# Patient Record
Sex: FEMALE | Race: WHITE | NOT HISPANIC OR LATINO | Employment: FULL TIME | URBAN - METROPOLITAN AREA
[De-identification: names, ages, dates, MRNs, and addresses within clinical notes are randomized per-mention and may not be internally consistent; named-entity substitution may affect disease eponyms.]

---

## 2018-08-24 PROBLEM — O99.820 GBS (GROUP B STREPTOCOCCUS CARRIER), +RV CULTURE, CURRENTLY PREGNANT: Status: ACTIVE | Noted: 2018-08-24

## 2018-09-18 NOTE — H&P (VIEW-ONLY)
`History and Physical  Obstetrics      SUBJECTIVE:     Karis Mckenzie is a 28 y.o.  female at 39w5d  admitted for induction of labor.  Her current obstetrical history is significant for n/a.        (Not in a hospital admission)    Review of patient's allergies indicates:  No Known Allergies     Past Medical History:   Diagnosis Date    Hypothyroidism     Ovarian cyst      Past Surgical History:   Procedure Laterality Date    CYSTECTOMY-OVARIAN Left 2014    Performed by [facility]oll [facility] at [facility]    OOPHERECTOMY Right 2014    Performed by [facility] at [facility]    OOPHORECTOMY      dermoid     Family History   Problem Relation Age of Onset    Breast cancer Neg Hx     Colon cancer Neg Hx     Ovarian cancer Neg Hx      Social History     Tobacco Use    Smoking status: Never Smoker    Smokeless tobacco: Never Used   Substance Use Topics    Alcohol use: Yes     Comment: occassionally    Drug use: No        OBJECTIVE:     Vital Signs (Most Recent)  BP: (!) 137/91 (18 0851)    Physical Exam:  General:  Normal, alert and oriented                       Abdomen: Soft gravid no FT   Uterine Size:  c/w dates   Presentations:  vertex   FHT: reviewed   Pelvis: NEFG   Cervix:     Dilation: 2 cm    Effacement: 75%    Station:  -3               Laboratory:  @LABRCNTIP(ABORH,HEPBSAG,RUBELLAIGGSC,GBS,AFP,SLHAMWW3OF)@   ASSESSMENT/PLAN:     39w5d gestation.  Not in labor.   Conditions: N/A

## 2018-09-19 ENCOUNTER — HOSPITAL ENCOUNTER (INPATIENT)
Facility: HOSPITAL | Age: 28
LOS: 3 days | Discharge: HOME | End: 2018-09-22
Attending: OBSTETRICS & GYNECOLOGY | Admitting: OBSTETRICS & GYNECOLOGY
Payer: COMMERCIAL

## 2018-09-19 ENCOUNTER — ANESTHESIA (OUTPATIENT)
Dept: OBSTETRICS AND GYNECOLOGY | Facility: HOSPITAL | Age: 28
End: 2018-09-19
Payer: COMMERCIAL

## 2018-09-19 ENCOUNTER — ANESTHESIA EVENT (OUTPATIENT)
Dept: OBSTETRICS AND GYNECOLOGY | Facility: HOSPITAL | Age: 28
End: 2018-09-19
Payer: COMMERCIAL

## 2018-09-19 DIAGNOSIS — Z34.83 PRENATAL CARE, SUBSEQUENT PREGNANCY, THIRD TRIMESTER: ICD-10-CM

## 2018-09-19 LAB
ABO + RH BLD: NORMAL
BASOPHILS # BLD AUTO: 0.04 K/UL
BASOPHILS NFR BLD: 0.3 %
BLD GP AB SCN CELLS X3 SERPL QL: NORMAL
DIFFERENTIAL METHOD: ABNORMAL
EOSINOPHIL # BLD AUTO: 0.1 K/UL
EOSINOPHIL NFR BLD: 0.7 %
ERYTHROCYTE [DISTWIDTH] IN BLOOD BY AUTOMATED COUNT: 13.1 %
HCT VFR BLD AUTO: 35.7 %
HGB BLD-MCNC: 11.9 G/DL
LYMPHOCYTES # BLD AUTO: 2 K/UL
LYMPHOCYTES NFR BLD: 16.3 %
MCH RBC QN AUTO: 31.2 PG
MCHC RBC AUTO-ENTMCNC: 33.3 G/DL
MCV RBC AUTO: 94 FL
MONOCYTES # BLD AUTO: 0.9 K/UL
MONOCYTES NFR BLD: 7.6 %
NEUTROPHILS # BLD AUTO: 9.2 K/UL
NEUTROPHILS NFR BLD: 74.8 %
PLATELET # BLD AUTO: 143 K/UL
PMV BLD AUTO: 13 FL
RBC # BLD AUTO: 3.81 M/UL
WBC # BLD AUTO: 12.36 K/UL

## 2018-09-19 PROCEDURE — 27200710 HC EPIDURAL INFUSION PUMP SET: Performed by: ANESTHESIOLOGY

## 2018-09-19 PROCEDURE — S0020 INJECTION, BUPIVICAINE HYDRO: HCPCS | Performed by: ANESTHESIOLOGY

## 2018-09-19 PROCEDURE — 72200005 HC VAGINAL DELIVERY LEVEL II

## 2018-09-19 PROCEDURE — 86850 RBC ANTIBODY SCREEN: CPT

## 2018-09-19 PROCEDURE — 63600175 PHARM REV CODE 636 W HCPCS: Performed by: OBSTETRICS & GYNECOLOGY

## 2018-09-19 PROCEDURE — 63600175 PHARM REV CODE 636 W HCPCS

## 2018-09-19 PROCEDURE — 72100003 HC LABOR CARE, EA. ADDL. 8 HRS

## 2018-09-19 PROCEDURE — 27800517 HC TRAY,EPIDURAL-CONTINUOUS: Performed by: ANESTHESIOLOGY

## 2018-09-19 PROCEDURE — 72100002 HC LABOR CARE, 1ST 8 HOURS

## 2018-09-19 PROCEDURE — 85025 COMPLETE CBC W/AUTO DIFF WBC: CPT

## 2018-09-19 PROCEDURE — 25000003 PHARM REV CODE 250: Performed by: ANESTHESIOLOGY

## 2018-09-19 PROCEDURE — 25000003 PHARM REV CODE 250: Performed by: OBSTETRICS & GYNECOLOGY

## 2018-09-19 PROCEDURE — 51702 INSERT TEMP BLADDER CATH: CPT

## 2018-09-19 PROCEDURE — 86592 SYPHILIS TEST NON-TREP QUAL: CPT

## 2018-09-19 PROCEDURE — 11000001 HC ACUTE MED/SURG PRIVATE ROOM

## 2018-09-19 PROCEDURE — 0HQ9XZZ REPAIR PERINEUM SKIN, EXTERNAL APPROACH: ICD-10-PCS | Performed by: OBSTETRICS & GYNECOLOGY

## 2018-09-19 PROCEDURE — 59409 OBSTETRICAL CARE: CPT | Mod: AA,,, | Performed by: ANESTHESIOLOGY

## 2018-09-19 PROCEDURE — 62326 NJX INTERLAMINAR LMBR/SAC: CPT | Performed by: ANESTHESIOLOGY

## 2018-09-19 RX ORDER — ONDANSETRON 8 MG/1
8 TABLET, ORALLY DISINTEGRATING ORAL EVERY 8 HOURS PRN
Status: DISCONTINUED | OUTPATIENT
Start: 2018-09-19 | End: 2018-09-19

## 2018-09-19 RX ORDER — LEVOTHYROXINE SODIUM 25 UG/1
75 TABLET ORAL
Status: DISCONTINUED | OUTPATIENT
Start: 2018-09-20 | End: 2018-09-22 | Stop reason: HOSPADM

## 2018-09-19 RX ORDER — SODIUM CHLORIDE, SODIUM LACTATE, POTASSIUM CHLORIDE, CALCIUM CHLORIDE 600; 310; 30; 20 MG/100ML; MG/100ML; MG/100ML; MG/100ML
INJECTION, SOLUTION INTRAVENOUS CONTINUOUS
Status: DISCONTINUED | OUTPATIENT
Start: 2018-09-19 | End: 2018-09-19

## 2018-09-19 RX ORDER — HYDROCODONE BITARTRATE AND ACETAMINOPHEN 10; 325 MG/1; MG/1
1 TABLET ORAL EVERY 4 HOURS PRN
Status: DISCONTINUED | OUTPATIENT
Start: 2018-09-19 | End: 2018-09-22 | Stop reason: HOSPADM

## 2018-09-19 RX ORDER — OXYTOCIN/RINGER'S LACTATE 20/1000 ML
41.65 PLASTIC BAG, INJECTION (ML) INTRAVENOUS CONTINUOUS
Status: ACTIVE | OUTPATIENT
Start: 2018-09-19 | End: 2018-09-19

## 2018-09-19 RX ORDER — IBUPROFEN 600 MG/1
600 TABLET ORAL EVERY 6 HOURS PRN
Status: DISCONTINUED | OUTPATIENT
Start: 2018-09-19 | End: 2018-09-22 | Stop reason: HOSPADM

## 2018-09-19 RX ORDER — BUPIVACAINE HYDROCHLORIDE 2.5 MG/ML
INJECTION, SOLUTION INFILTRATION; PERINEURAL
Status: COMPLETED | OUTPATIENT
Start: 2018-09-19 | End: 2018-09-19

## 2018-09-19 RX ORDER — HYDROCODONE BITARTRATE AND ACETAMINOPHEN 5; 325 MG/1; MG/1
1 TABLET ORAL EVERY 4 HOURS PRN
Status: DISCONTINUED | OUTPATIENT
Start: 2018-09-19 | End: 2018-09-22 | Stop reason: HOSPADM

## 2018-09-19 RX ORDER — ACETAMINOPHEN 325 MG/1
650 TABLET ORAL EVERY 6 HOURS PRN
Status: DISCONTINUED | OUTPATIENT
Start: 2018-09-19 | End: 2018-09-22 | Stop reason: HOSPADM

## 2018-09-19 RX ORDER — SODIUM CHLORIDE 9 MG/ML
INJECTION, SOLUTION INTRAVENOUS
Status: DISCONTINUED | OUTPATIENT
Start: 2018-09-19 | End: 2018-09-19

## 2018-09-19 RX ORDER — OXYTOCIN/RINGER'S LACTATE 20/1000 ML
2 PLASTIC BAG, INJECTION (ML) INTRAVENOUS CONTINUOUS
Status: DISCONTINUED | OUTPATIENT
Start: 2018-09-19 | End: 2018-09-19

## 2018-09-19 RX ORDER — MISOPROSTOL 200 UG/1
600 TABLET ORAL
Status: DISCONTINUED | OUTPATIENT
Start: 2018-09-19 | End: 2018-09-22 | Stop reason: HOSPADM

## 2018-09-19 RX ORDER — HYDROCORTISONE 25 MG/G
CREAM TOPICAL 3 TIMES DAILY PRN
Status: DISCONTINUED | OUTPATIENT
Start: 2018-09-19 | End: 2018-09-22 | Stop reason: HOSPADM

## 2018-09-19 RX ORDER — DIPHENHYDRAMINE HYDROCHLORIDE 50 MG/ML
25 INJECTION INTRAMUSCULAR; INTRAVENOUS EVERY 4 HOURS PRN
Status: DISCONTINUED | OUTPATIENT
Start: 2018-09-19 | End: 2018-09-22 | Stop reason: HOSPADM

## 2018-09-19 RX ADMIN — SODIUM CHLORIDE, SODIUM LACTATE, POTASSIUM CHLORIDE, AND CALCIUM CHLORIDE: .6; .31; .03; .02 INJECTION, SOLUTION INTRAVENOUS at 08:09

## 2018-09-19 RX ADMIN — MEPIVACAINE HYDROCHLORIDE 300 MG: 10 INJECTION, SOLUTION EPIDURAL; INFILTRATION at 12:09

## 2018-09-19 RX ADMIN — SODIUM CHLORIDE, SODIUM LACTATE, POTASSIUM CHLORIDE, AND CALCIUM CHLORIDE: .6; .31; .03; .02 INJECTION, SOLUTION INTRAVENOUS at 06:09

## 2018-09-19 RX ADMIN — BUPIVACAINE HYDROCHLORIDE 10 ML: 2.5 INJECTION, SOLUTION INFILTRATION; PERINEURAL at 07:09

## 2018-09-19 RX ADMIN — AMPICILLIN SODIUM 2 G: 2 INJECTION, POWDER, FOR SOLUTION INTRAMUSCULAR; INTRAVENOUS at 06:09

## 2018-09-19 RX ADMIN — Medication 2 MILLI-UNITS/MIN: at 06:09

## 2018-09-19 RX ADMIN — AMPICILLIN SODIUM 1 G: 1 INJECTION, POWDER, FOR SOLUTION INTRAMUSCULAR; INTRAVENOUS at 10:09

## 2018-09-19 NOTE — LACTATION NOTE
"This note was copied from a baby's chart.     09/19/18 1250   Maternal Infant Assessment   Breast Density Bilateral:;soft   Areola Bilateral:;elastic   Nipple(s) Bilateral:;everted   Infant Assessment   Sucking Reflex present   Rooting Reflex present   Swallow Reflex present   LATCH Score   Latch 2-->grasps breast, tongue down, lips flanged, rhythmic sucking   Audible Swallowing 2-->spontaneous and intermittent (24 hrs old)   Type Of Nipple 2-->everted (after stimulation)   Comfort (Breast/Nipple) 2-->soft/nontender   Hold (Positioning) 1-->minimal assist, teach one side: mother does other, staff holds   Score (less than 7 for 2/more consecutive times, consult Lactation Consultant) 9   Maternal Infant Feeding   Maternal Emotional State relaxed;assist needed   Infant Positioning cradle   Signs of Milk Transfer audible swallow;infant jaw motion present   Time Spent (min) 0-15 min   Latch Assistance yes   Breastfeeding History   Breastfeeding History no   Infant First Feeding   Breastfeeding Left Side (min) 30 Min  (cont to nurse)   Feeding Infant   Feeding Tolerance/Success alert for feeding   Effective Latch During Feeding yes   Audible Swallow yes   Suck/Swallow Coordination present   Lactation Referrals   Lactation Consult Initial assessment;Knowledge deficit   Lactation Interventions   Attachment Promotion breastfeeding assistance provided     Baby latched well at this time; audible swallows noted.  Initial latch with assist per GALILEA CARRERA RN.   Basic breastfeeding instructions given and Mother's Breastfeeding Guide reviewed.  Encouraged to call for assist prn.  States "understand" and verbalized appropriate recall.    "

## 2018-09-19 NOTE — OP NOTE
Procedure:      Indications: 28 y.o.  with IUP at 39w6d admitted for induction.  Patient progressed to C/+2.    Findings:  male infant, vertex presentation, APGARS 9 at 1 minute, 9 at 5 minutes, weight is pending.    Procedure  Laceration :  Yes  Episiotomy:  No  Forceps: No  Vacuum: No    EBL: 300cc    Specimen:  Placenta sent No    Complications:  None    Patient was placed in dorsal lithotomy position.  Patient was prepped and draped in a sterile fashion.  Patient began to push.  The vertex crowned and delivered.  The mouth and nose were suctioned with the bulb.  The remaining infant delivered without difficulty. The cord was cut and clamped.  The infant was vigorous upon delivery.  The infant was handed to the nurse.  The placenta delivered spontaneously and intact.The labial laceration was sutured with 20 chromic.  The patient tolerated the procedure well.  Patient will be monitored in recovery.

## 2018-09-19 NOTE — PLAN OF CARE
Problem: Patient Care Overview  Goal: Plan of Care Review  Outcome: Ongoing (interventions implemented as appropriate)  Pt awake and alert, denies discomforts. Pt attempting to breast feed and pt encouraged for effort. Vidhya-care done with usage of squirt bottle and Witch hazel pads to decrease swelling and discomforts. Pt has minimal bleeding to pad. Pt able to ambulate in room without difficulty.  at bedside assisting with family.

## 2018-09-19 NOTE — ANESTHESIA PREPROCEDURE EVALUATION
09/19/2018  Karis Mckenzie is a 28 y.o., female.    Anesthesia Evaluation    I have reviewed the Patient Summary Reports.    I have reviewed the Nursing Notes.   I have reviewed the Medications.     Review of Systems  Anesthesia Hx:  No problems with previous Anesthesia Denies Hx of Anesthetic complications  History of prior surgery of interest to airway management or planning: Previous anesthesia: General Denies Family Hx of Anesthesia complications.   Denies Personal Hx of Anesthesia complications.   Social:  Non-Smoker    Hematology/Oncology:         -- Anemia:   EENT/Dental:EENT/Dental Normal   Cardiovascular:  Cardiovascular Normal Exercise tolerance: good     Pulmonary:  Pulmonary Normal    Renal/:  Renal/ Normal     Hepatic/GI:  Hepatic/GI Normal    Musculoskeletal:  Musculoskeletal Normal    OB/GYN/PEDS:  GBS positive.    Neurological:  Neurology Normal    Endocrine:   Hypothyroidism    Dermatological:  Skin Normal    Psych:  Psychiatric Normal           Physical Exam  General:  Well nourished    Airway/Jaw/Neck:  Airway Findings:           Mental Status:  Mental Status Findings:  Cooperative, Alert and Oriented         Anesthesia Plan  Type of Anesthesia, risks & benefits discussed:  Anesthesia Type:  epidural, general, spinal  Patient's Preference:   Intra-op Monitoring Plan:   Intra-op Monitoring Plan Comments:   Post Op Pain Control Plan: multimodal analgesia, per primary service following discharge from PACU, epidural analgesia and PCA  Post Op Pain Control Plan Comments:   Induction:   IV  Beta Blocker:  Patient is not currently on a Beta-Blocker (No further documentation required).       Informed Consent: Patient understands risks and agrees with Anesthesia plan.  Questions answered. Anesthesia consent signed with patient.  ASA Score: 2     Day of Surgery Review of History & Physical:      H&P completed by Anesthesiologist.       Ready For Surgery From Anesthesia Perspective.

## 2018-09-19 NOTE — ANESTHESIA PROCEDURE NOTES
Epidural    Patient location during procedure: OB   Reason for block: primary anesthetic   Diagnosis: Labor   Start time: 9/19/2018 7:36 AM  Timeout: 9/19/2018 7:33 AM  End time: 9/19/2018 7:58 AM  Staffing  Anesthesiologist: Raymond Saucedo MD  Performed: anesthesiologist   Preanesthetic Checklist  Completed: patient identified, site marked, surgical consent, pre-op evaluation, timeout performed, IV checked, risks and benefits discussed, monitors and equipment checked, anesthesia consent given, hand hygiene performed and patient being monitored  Preparation  Patient position: sitting  Prep: Betadine  Patient monitoring: Pulse Ox and Blood Pressure  Epidural  Skin Anesthetic: lidocaine 1%  Skin Wheal: 3 mL  Administration type: single shot  Approach: midline  Interspace: L3-4  Needle and Epidural Catheter  Needle type: Tuohy   Needle gauge: 17  Needle insertion depth: 6 cm  Catheter type: springwound  Catheter size: 19 G  Catheter at skin depth: 11 cm  Test dose: 3 mL of lidocaine 1.5% with Epi 1-to-200,000  Additional Documentation: incremental injection, negative aspiration for heme and CSF, no paresthesia on injection, no signs/symptoms of IV or SA injection, no significant pain on injection and no significant complaints from patient  Needle localization: anatomical landmarks  Assessment  Ease of block: easy  Patient's tolerance of the procedure: comfortable throughout block and no complaints  Post dural Puncture Headache?: No  Additional Notes  Fentanyl 100mcg given with bupivicaine.   Timeout performed prior to procedure.

## 2018-09-19 NOTE — PLAN OF CARE
Problem: Labor (Cervical Ripen, Induct, Augment) (Adult,Obstetrics,Pediatric)  Goal: Signs and Symptoms of Listed Potential Problems Will be Absent, Minimized or Managed (Labor)  Signs and symptoms of listed potential problems will be absent, minimized or managed by discharge/transition of care (reference Labor (Cervical Ripen, Induct, Augment) (Adult,Obstetrics,Pediatric) [facility]).  Outcome: Ongoing (interventions implemented as appropriate)  C/o pain with ctx.ivf's increased to prep for epiduraL anesthesia notified

## 2018-09-20 LAB
BASOPHILS # BLD AUTO: 0.02 K/UL
BASOPHILS NFR BLD: 0.1 %
DIFFERENTIAL METHOD: ABNORMAL
EOSINOPHIL # BLD AUTO: 0 K/UL
EOSINOPHIL NFR BLD: 0.2 %
ERYTHROCYTE [DISTWIDTH] IN BLOOD BY AUTOMATED COUNT: 13 %
HCT VFR BLD AUTO: 31.3 %
HGB BLD-MCNC: 10.6 G/DL
LYMPHOCYTES # BLD AUTO: 1.9 K/UL
LYMPHOCYTES NFR BLD: 11.4 %
MCH RBC QN AUTO: 31.9 PG
MCHC RBC AUTO-ENTMCNC: 33.9 G/DL
MCV RBC AUTO: 94 FL
MONOCYTES # BLD AUTO: 0.9 K/UL
MONOCYTES NFR BLD: 5.3 %
NEUTROPHILS # BLD AUTO: 13.7 K/UL
NEUTROPHILS NFR BLD: 82.7 %
PLATELET # BLD AUTO: 142 K/UL
PMV BLD AUTO: 12.4 FL
RBC # BLD AUTO: 3.32 M/UL
RPR SER QL: NORMAL
WBC # BLD AUTO: 16.52 K/UL

## 2018-09-20 PROCEDURE — 11000001 HC ACUTE MED/SURG PRIVATE ROOM

## 2018-09-20 PROCEDURE — 85025 COMPLETE CBC W/AUTO DIFF WBC: CPT

## 2018-09-20 PROCEDURE — 25000003 PHARM REV CODE 250: Performed by: OBSTETRICS & GYNECOLOGY

## 2018-09-20 PROCEDURE — 36415 COLL VENOUS BLD VENIPUNCTURE: CPT

## 2018-09-20 RX ADMIN — IBUPROFEN 600 MG: 600 TABLET, FILM COATED ORAL at 05:09

## 2018-09-20 RX ADMIN — LEVOTHYROXINE SODIUM 75 MCG: 25 TABLET ORAL at 05:09

## 2018-09-20 RX ADMIN — IBUPROFEN 600 MG: 600 TABLET, FILM COATED ORAL at 06:09

## 2018-09-20 NOTE — PROGRESS NOTES
Subjective:     No complaints.  Ambulating, voiding, tolerating PO.  Bleeding and pain are controlled.     Review of Systems  Nine system ROS negative    Objective:   Vitals:  Temp: 96.6 °F (35.9 °C) (18 1140)  Pulse: 85 (18 1140)  Resp: 16 (18 1140)  BP: 121/73 (18 1140)  SpO2: 99 % (18 1130)  Temp:  [96.6 °F (35.9 °C)-98.5 °F (36.9 °C)]   Pulse:  [80-86]   Resp:  [16-20]   BP: (109-137)/(59-85)   General: no acute distress, alert and oriented to person, place and time  Breasts: nontender, nonengorged  Abdomen: non-distended, non-tender to palpation, uterus firm and below the umbilicus  Pelvic: deferred, reported minimal uterine bleeding  Extremities: calves non-tender to palpation, no calf edema, erythema or palpable cords    Recent Labs   Lab  18   0620   WBC  16.52*   HGB  10.6*   HCT  31.3*   PLT  142*     Assessment:     Postpartum Day #1 s/p     Plan:     Routine postpartum care

## 2018-09-20 NOTE — PLAN OF CARE
Problem: Patient Care Overview  Goal: Plan of Care Review  Outcome: Ongoing (interventions implemented as appropriate)  Assumed care of the patient from Patience RODRIGUEZ RN. Patient alert and oriented with even & unlabored respirations. Pain managed with scheduled PRN motrin once for a slight headache. POC discussed with the patient, and the patient verbalized understanding.

## 2018-09-20 NOTE — INTERVAL H&P NOTE
The patient has been examined and the H&P has been reviewed:    I concur with the findings and no changes have occurred since H&P was written.        Active Hospital Problems    Diagnosis  POA    *Prenatal care, subsequent pregnancy, third trimester [Z34.83]  Not Applicable      Resolved Hospital Problems   No resolved problems to display.

## 2018-09-20 NOTE — PLAN OF CARE
Problem: Patient Care Overview  Goal: Plan of Care Review  Outcome: Ongoing (interventions implemented as appropriate)  Reviewed plan of care, verbalizes understanding.  Pain controlled with Motrin, offered Percocet, but refused.  Bonds well with  when not under double phototherapy.  Significant other at bedside assisting mother with

## 2018-09-20 NOTE — ANESTHESIA POSTPROCEDURE EVALUATION
"Anesthesia Post Evaluation    Patient: Karis Mckenzie    Procedure(s) Performed: OB consult for Epidural     Final Anesthesia Type: epidural  Patient location during evaluation: labor & delivery  Post-procedure vital signs: reviewed and stable  Pain management: adequate  PONV status at discharge: No PONV      Follow-up: Post delivery management by Ob/GYN.  Anesthesia readily available to for consulttation if needed.           Visit Vitals  /82 (BP Location: Right arm, Patient Position: Lying)   Pulse 84   Temp 36.6 °C (97.9 °F) (Oral)   Resp 16   Ht 5' 6" (1.676 m)   Wt 68 kg (150 lb)   LMP 12/14/2017   SpO2 99%   Breastfeeding? Yes   BMI 24.21 kg/m²       Pain/Ariana Score: Pain Assessment Performed: Yes (9/20/2018  5:42 AM)  Presence of Pain: denies (9/20/2018  5:42 AM)  Pain Rating Prior to Med Admin: 4 (9/20/2018  5:42 AM)        "

## 2018-09-20 NOTE — PLAN OF CARE
Problem: Labor (Cervical Ripen, Induct, Augment) (Adult,Obstetrics,Pediatric)  Goal: Signs and Symptoms of Listed Potential Problems Will be Absent, Minimized or Managed (Labor)  Signs and symptoms of listed potential problems will be absent, minimized or managed by discharge/transition of care (reference Labor (Cervical Ripen, Induct, Augment) (Adult,Obstetrics,Pediatric) [facility]).  Outcome: Outcome(s) achieved Date Met: 18   delivered on 18 @ 11:48. Outcome achieved.

## 2018-09-21 VITALS
HEART RATE: 78 BPM | HEIGHT: 66 IN | OXYGEN SATURATION: 99 % | BODY MASS INDEX: 24.11 KG/M2 | TEMPERATURE: 97 F | RESPIRATION RATE: 17 BRPM | DIASTOLIC BLOOD PRESSURE: 83 MMHG | SYSTOLIC BLOOD PRESSURE: 127 MMHG | WEIGHT: 150 LBS

## 2018-09-21 PROCEDURE — 11000001 HC ACUTE MED/SURG PRIVATE ROOM

## 2018-09-21 PROCEDURE — 63600150 PHARM REV CODE 636: Performed by: OBSTETRICS & GYNECOLOGY

## 2018-09-21 PROCEDURE — 90686 IIV4 VACC NO PRSV 0.5 ML IM: CPT | Performed by: OBSTETRICS & GYNECOLOGY

## 2018-09-21 PROCEDURE — 90471 IMMUNIZATION ADMIN: CPT | Performed by: OBSTETRICS & GYNECOLOGY

## 2018-09-21 PROCEDURE — 25000003 PHARM REV CODE 250: Performed by: OBSTETRICS & GYNECOLOGY

## 2018-09-21 RX ORDER — HYDROCORTISONE 25 MG/G
CREAM TOPICAL 3 TIMES DAILY PRN
Qty: 30 G | Refills: 1 | Status: SHIPPED | OUTPATIENT
Start: 2018-09-21 | End: 2018-10-01

## 2018-09-21 RX ORDER — HYDROCODONE BITARTRATE AND ACETAMINOPHEN 5; 325 MG/1; MG/1
1 TABLET ORAL EVERY 6 HOURS PRN
Qty: 12 TABLET | Refills: 0 | Status: SHIPPED | OUTPATIENT
Start: 2018-09-21 | End: 2019-03-15

## 2018-09-21 RX ORDER — IBUPROFEN 600 MG/1
600 TABLET ORAL EVERY 6 HOURS PRN
Qty: 28 TABLET | Refills: 0 | Status: SHIPPED | OUTPATIENT
Start: 2018-09-21 | End: 2019-03-15

## 2018-09-21 RX ADMIN — LEVOTHYROXINE SODIUM 75 MCG: 25 TABLET ORAL at 05:09

## 2018-09-21 RX ADMIN — INFLUENZA A VIRUS A/MICHIGAN/45/2015 X-275 (H1N1) ANTIGEN (FORMALDEHYDE INACTIVATED), INFLUENZA A VIRUS A/SINGAPORE/INFIMH-16-0019/2016 IVR-186 (H3N2) ANTIGEN (FORMALDEHYDE INACTIVATED), INFLUENZA B VIRUS B/PHUKET/3073/2013 ANTIGEN (FORMALDEHYDE INACTIVATED), AND INFLUENZA B VIRUS B/MARYLAND/15/2016 BX-69A ANTIGEN (FORMALDEHYDE INACTIVATED) 0.5 ML: 15; 15; 15; 15 INJECTION, SUSPENSION INTRAMUSCULAR at 06:09

## 2018-09-21 NOTE — DISCHARGE SUMMARY
Discharge Summary - Obstetrics    Diagnosis:   1.   2. Hypothyroid    Patient was admitted, underwent a normal spontaneous vaginal delivery without complication.  Please see H&P and operative report for full details.  Postpartum course was uneventful.    Vitals:  Temp: 96.6 °F (35.9 °C) (18 1211)  Pulse: 78 (18 1211)  Resp: 18 (18 1211)  BP: 115/77 (18 1211)  SpO2: 99 % (18 1130)  Temp:  [96.4 °F (35.8 °C)-98.1 °F (36.7 °C)]   Pulse:  [72-92]   Resp:  [16-18]   BP: (115-128)/(67-83)   General: no acute distress, alert and oriented to person, place and time  Breasts: nontender, nonengorged  Abdomen: non-distended, non-tender to palpation, uterus firm and below the umbilicus  Pelvic: deferred, reported minimal uterine bleeding  Extremities: calves non-tender to palpation, no calf edema, erythema or palpable cords    Recent Labs   Lab  18   0620   WBC  16.52*   HGB  10.6*   HCT  31.3*   PLT  142*       Disposition: Home    Condition: Stable    Diet: Regular    Medications:  Current Discharge Medication List      START taking these medications    Details   HYDROcodone-acetaminophen (NORCO) 5-325 mg per tablet Take 1 tablet by mouth every 6 (six) hours as needed.  Qty: 12 tablet, Refills: 0      hydrocortisone (ANUSOL-HC) 2.5 % rectal cream Place rectally 3 (three) times daily as needed.  Qty: 30 g, Refills: 1      ibuprofen (ADVIL,MOTRIN) 600 MG tablet Take 1 tablet (600 mg total) by mouth every 6 (six) hours as needed (cramping).  Qty: 28 tablet, Refills: 0      lidocaine 5 % Gel Apply 1 application topically 3 (three) times daily as needed (place on inflammed hemorrhoids as needed for pain).  Qty: 30 g, Refills: 1      prenatal vit 7-iron-folic-dha 28-1. mg Cap Take 1 tablet by mouth once daily.  Qty: 30 each, Refills: 10         CONTINUE these medications which have NOT CHANGED    Details   levothyroxine (SYNTHROID) 75 MCG tablet          STOP taking these medications        PNV no.22-iron cbn,gluc-FA-dss 27-1-50 mg Tab Comments:   Reason for Stopping:               Activity: Pelvic rest    Follow-up: 6 weeks    Precautions: Patient counseled to return or call for temperature greater than 100.4, copious foul- smelling vaginal discharge, profuse vaginal bleeding, extreme pain, nausea or vomiting, or any concerns.    No discharge procedures on file.

## 2018-09-21 NOTE — LACTATION NOTE
Review breastfeeding discharge with parents now -aware of need to monitor wet and dirty diapers over next few days and referred to breastfeeding guide for community resources -states understanding of all information and all questions answered

## 2018-09-21 NOTE — PLAN OF CARE
Problem: Breastfeeding (Adult,Obstetrics,Pediatric)  Goal: Signs and Symptoms of Listed Potential Problems Will be Absent, Minimized or Managed (Breastfeeding)  Signs and symptoms of listed potential problems will be absent, minimized or managed by discharge/transition of care (reference Breastfeeding (Adult,Obstetrics,Pediatric) [facility]).  Outcome: Outcome(s) achieved Date Met: 09/21/18  Plan breastfeed on demand and monitor output over next few days

## 2018-09-21 NOTE — LACTATION NOTE
This note was copied from a baby's chart.     09/21/18 1010   Maternal Infant Assessment   Breast Density Bilateral:;soft;filling   Areola Bilateral:;elastic   Nipple(s) Bilateral:;everted   Infant Assessment   Sucking Reflex present   Rooting Reflex present   Swallow Reflex present   LATCH Score   Latch 2-->grasps breast, tongue down, lips flanged, rhythmic sucking   Audible Swallowing 2-->spontaneous and intermittent (24 hrs old)   Type Of Nipple 2-->everted (after stimulation)   Comfort (Breast/Nipple) 1-->filling, red/small blisters/bruises, mild/mod discomfort   Hold (Positioning) 1-->minimal assist, teach one side: mother does other, staff holds   Score (less than 7 for 2/more consecutive times, consult Lactation Consultant) 8   Maternal Infant Feeding   Maternal Emotional State independent;relaxed   Infant Positioning cradle   Signs of Milk Transfer audible swallow;breasts soften with feeding;infant jaw motion present   Presence of Pain no   Time Spent (min) 0-15 min   Latch Assistance no   Breastfeeding Education adequate infant intake;adequate milk volume;importance of skin-to-skin contact;increasing milk supply;label/storage of breast milk   Infant First Feeding   Breastfeeding breastfeeding, bilateral   Breastfeeding Right Side (min) 10 Min   Feeding Infant   Feeding Readiness Cues rooting   Feeding Tolerance/Success sleepy;strong suck;coordinated suck;coordinated swallow;arousal required   Effective Latch During Feeding yes   Suck/Swallow Coordination present   Equipment Type/Education   Pump Type Symphony   Breast Pump Type double electric, hospital grade   Breast Pump Flange Type hard   Breast Pump Flange Size 24 mm   Breast Pumping Bilateral Breasts:   Lactation Referrals   Lactation Consult Breastfeeding assessment;Follow up   Lactation Interventions   Attachment Promotion breastfeeding assistance provided;counseling provided;infant-mother separation minimized;privacy provided;role responsibility  promoted;rooming-in promoted;skin-to-skin contact encouraged   Latch Promotion suck stimulated with colostrum drop   mother with baby skin to skin -baby sleeping at breast -baby stimulated for sucking and swallowing -taught breast compressions to keep baby actively sucking and swallowing -reinforced importance of feeding on demand and often today to maintain bilirubin level -breasts filling today and baby softening well

## 2018-09-21 NOTE — PLAN OF CARE
Problem: Patient Care Overview  Goal: Plan of Care Review  Outcome: Ongoing (interventions implemented as appropriate)  Plan of care reviewed. Fundus firm. Bonding with infant. Breastfeeding on cue. Frequent checks made to ensure safety and comfort. Denies need for pain medication. Bed low, call bell within reach. Will continue to monitor.

## 2018-09-21 NOTE — DISCHARGE INSTRUCTIONS
After a Vaginal Birth    General Discharge Instructions    · May follow a regular diet, unless otherwise discussed with physician.    · Take showers, not baths unless otherwise discussed with physician.    · Activity as tolerated.    · No lifting or heavy exercise for 6 weeks, no driving for 2 weeks, no sexual intercourse, douching or tampons for 6 weeks    · May return to work/school as discussed with physician  · Take medications as directed    · Discuss birth control with physician    · Breast care support bra worn at all times    · Lactation consultant referral number ( [phone] or [phone])    Call Your Healthcare Provider Right Away If You Have:  · A temperature of 100.4°F or higher.  · If your blood pressure is over 155/105.  · You have difficulty catching your breath or trouble breathing.  · Heavy vaginal bleeding, clots, or vaginal discharge with foul odor. (heavier than menses)  · Persistent nausea or vomiting.  · You gain more than 3 pounds in 3 days.  · Severe headaches not relieved by Tylenol (acetaminophen) or Motrin (ibuprofen)  · Blurry or double vision, see spots or flashing lights.  · Dizziness or fainting.  · New onset swelling or worsening of existing swelling.  · Burning or pain when you urinate.  · No bowel movement for 5 days.  · Redness, warmth, swelling, or pain in the lower leg.  · Redness, discharge, or pain worse than you had in the hospital.  · Burning, pain, red streaks, or lumpy areas in your breasts.  · Cracks, blisters, or blood on your nipples.  · Feelings of extreme sadness or anxiety, or a feeling that you dont want to be with your baby.  · If you have any new or unusual symptoms or have questions or concerns    Some of these symptoms can occur up to 4 to 6 weeks after delivery. This can be a sign of pre-eclampsia, which is a serious disease that can cause stroke, seizures, organ damage, or death. Do not wait to call your doctor or seek medical attention.            If  You Had Stitches  You may have received stitches in the skin near your vagina. The stitches might have closed an episiotomy (an incision that enlarges the opening of the vagina). Or you may have needed stitches to repair torn skin. Either way, your stitches should dissolve within weeks. Until then, you can help reduce discomfort, aid healing, and reduce your risk of infection by keeping the stitches clean. These tips can help:    · Gently wipe from front to back after you urinate or have a bowel movement.  · After wiping, spray warm water on the area. Or you can have a sitz bath. This means sitting in a tub with a few inches of water in it. Then pat the area dry or use a hairdryer on a cool setting.  · You can take a shower instead of a bath.  · Change sanitary pads at least every 2-4 hours.  · Place cold or heat packs on the area as directed by your doctors or nurses. Keep a thin towel between the pack and your skin.  · Sit on firm seats so the stitches pull less.     Follow-Up  Schedule a  follow-up exam with your healthcare provider for about 6 weeks after delivery. During this exam, your uterus and vaginal area will be checked. Contact your healthcare provider if you think you or your baby are having any problems.        Breastfeeding discharge instructions given with First Alert form and reviewed.  Also discussed:   AAP recommendation of exclusive breastfeeding for the first 6 months of life and continued breastfeeding with the introduction of supplemental foods beyond the first year of life.  Instructed on the recommendation to delay all bottle and pacifier use until after 4 weeks of age and breastfeeding is well established.  Discussed the benefits of exclusive breastfeeding for both mother and baby.  Discussed the risks of supplementation/pacifier use on the exclusivity of breastfeeding in the first 6 months. Feed the baby at the earliest sign of hunger or comfort  o Hands to mouth,  sucking motions  o Rooting or searching for something to suck on  o Dont wait for crying - it is a not a late sign of hunger; it is a sign of distress     The feedings may be 8-12 times per 24hrs and will not follow a schedule   Alternate the breast you start the feeding with, or start with the breast that feels the fullest   Switch breasts when the baby takes himself off the breast or falls asleep   Keep offering breasts until the baby looks full, no longer gives hunger signs, and stays asleep when placed on his back in the crib   If the baby is sleepy and wont wake for a feeding, put the baby skin-to-skin dressed in a diaper against the mothers bare chest   Sleep near your baby   The baby should be positioned and latched on to the breast correctly  o Chest-to-chest, chin in the breast  o Babys lips are flipped outward  o Babys mouth is stretched open wide like a shout  o Babys sucking should feel like tugging to the mother  - The baby should be drinking at the breast:  o You should hear swallowing or gulping throughout the feeding  o You should see milk on the babys lips when he comes off the breast  o Your breasts should be softer when the baby is finished feeding  o The baby should look relaxed at the end of feedings  o After the 4th day and your milk is in:  o The babys poop should turn bright yellow and be loose, watery, and seedy  o The baby should have at least 3-4 poops the size of the palm of your hand per day  o The baby should have at least 6-8 wet diapers per day  o The urine should be light yellow in color  You should drink when you are thirsty and eat a healthy diet when you are    hungry.     Take naps to get the rest you need.   Take medications and/or drink alcohol only with permission of your obstetrician    or the babys pediatrician.  You can also call the [facility],   ([phone]), Monday-Friday, 8am-5pm Central time, to get the most   up-to-date evidence-based  information on the use of medications during   pregnancy and breastfeeding.      The baby should be examined by a pediatrician at 3-5 days of age; unless ordered sooner by the pediatrician.  Once your milk comes in, the baby should be back to birth weight no later than 10-14 days of age.

## 2018-09-22 NOTE — PLAN OF CARE
Problem: Patient Care Overview  Goal: Plan of Care Review  Outcome: Outcome(s) achieved Date Met: 09/21/18  Patient in stable condition. Verbalizes understanding of care plan with good recall.

## 2018-09-24 ENCOUNTER — TELEPHONE (OUTPATIENT)
Dept: OBSTETRICS AND GYNECOLOGY | Facility: HOSPITAL | Age: 28
End: 2018-09-24

## 2018-09-25 ENCOUNTER — TELEPHONE (OUTPATIENT)
Dept: OBSTETRICS AND GYNECOLOGY | Facility: HOSPITAL | Age: 28
End: 2018-09-25

## 2018-10-21 NOTE — PLAN OF CARE
Problem: Patient Care Overview  Goal: Plan of Care Review  Outcome: Ongoing (interventions implemented as appropriate)  Breast feeding on cue, 8 or more times in 24 hours.  Call for assist prn.       3

## 2019-10-07 ENCOUNTER — OFFICE VISIT (OUTPATIENT)
Dept: URGENT CARE | Facility: CLINIC | Age: 29
End: 2019-10-07
Payer: COMMERCIAL

## 2019-10-07 VITALS
HEIGHT: 66 IN | BODY MASS INDEX: 19.29 KG/M2 | DIASTOLIC BLOOD PRESSURE: 84 MMHG | RESPIRATION RATE: 16 BRPM | OXYGEN SATURATION: 98 % | TEMPERATURE: 97 F | WEIGHT: 120 LBS | HEART RATE: 88 BPM | SYSTOLIC BLOOD PRESSURE: 132 MMHG

## 2019-10-07 DIAGNOSIS — J06.9 UPPER RESPIRATORY TRACT INFECTION, UNSPECIFIED TYPE: Primary | ICD-10-CM

## 2019-10-07 PROCEDURE — 99203 PR OFFICE/OUTPT VISIT, NEW, LEVL III, 30-44 MIN: ICD-10-PCS | Mod: S$GLB,,, | Performed by: FAMILY MEDICINE

## 2019-10-07 PROCEDURE — 3008F BODY MASS INDEX DOCD: CPT | Mod: CPTII,S$GLB,, | Performed by: FAMILY MEDICINE

## 2019-10-07 PROCEDURE — 99203 OFFICE O/P NEW LOW 30 MIN: CPT | Mod: S$GLB,,, | Performed by: FAMILY MEDICINE

## 2019-10-07 PROCEDURE — 3008F PR BODY MASS INDEX (BMI) DOCUMENTED: ICD-10-PCS | Mod: CPTII,S$GLB,, | Performed by: FAMILY MEDICINE

## 2019-10-07 RX ORDER — PREDNISONE 20 MG/1
TABLET ORAL
Qty: 5 TABLET | Refills: 0 | Status: SHIPPED | OUTPATIENT
Start: 2019-10-07 | End: 2020-03-13

## 2019-10-07 NOTE — PROGRESS NOTES
"Subjective:       Patient ID: Karis Mckenzie is a 29 y.o. female.    Vitals:  height is 5' 6" (1.676 m) and weight is 54.4 kg (120 lb). Her temperature is 97.4 °F (36.3 °C). Her blood pressure is 132/84 and her pulse is 88. Her respiration is 16 and oxygen saturation is 98%.     Chief Complaint: Sinus Problem    startd on Friday her 3-year-old son also has a head cold.  She came in because her sister is getting  on Saturday and wanted to feel better before then.    Sinus Problem   This is a new problem. The current episode started in the past 7 days. The problem has been gradually worsening since onset. Maximum temperature: pt felt feverish. Her pain is at a severity of 7/10. The pain is moderate. Associated symptoms include coughing, headaches, shortness of breath, sinus pressure and a sore throat. Pertinent negatives include no chills, congestion, diaphoresis or ear pain. Treatments tried: otc sinus meds.       Constitution: Positive for fever. Negative for chills, sweating and fatigue.   HENT: Positive for postnasal drip, sinus pain, sinus pressure and sore throat. Negative for ear pain, congestion and voice change.    Neck: Negative for painful lymph nodes.   Eyes: Negative for eye redness.   Respiratory: Positive for cough, sputum production and shortness of breath. Negative for chest tightness, bloody sputum, COPD, stridor, wheezing and asthma.    Gastrointestinal: Negative for nausea and vomiting.   Musculoskeletal: Negative for muscle ache.   Skin: Negative for rash.   Allergic/Immunologic: Negative for seasonal allergies and asthma.   Neurological: Positive for headaches.   Hematologic/Lymphatic: Negative for swollen lymph nodes.       Objective:      Physical Exam   Constitutional: She is oriented to person, place, and time. She appears well-developed and well-nourished. She is cooperative.  Non-toxic appearance. She does not appear ill. No distress.   HENT:   Head: Normocephalic and atraumatic. "   Right Ear: Hearing, tympanic membrane, external ear and ear canal normal.   Left Ear: Hearing, tympanic membrane, external ear and ear canal normal.   Nose: Nose normal. No mucosal edema, rhinorrhea or nasal deformity. No epistaxis. Right sinus exhibits no maxillary sinus tenderness and no frontal sinus tenderness. Left sinus exhibits no maxillary sinus tenderness and no frontal sinus tenderness.   Mouth/Throat: Uvula is midline, oropharynx is clear and moist and mucous membranes are normal. No trismus in the jaw. Normal dentition. No uvula swelling. No posterior oropharyngeal erythema.   Eyes: Conjunctivae and lids are normal. No scleral icterus.   Sclera clear bilat   Neck: Trachea normal, full passive range of motion without pain and phonation normal. Neck supple.   Cardiovascular: Normal rate, regular rhythm, normal heart sounds, intact distal pulses and normal pulses.   Pulmonary/Chest: Effort normal and breath sounds normal. No respiratory distress.   Abdominal: Normal appearance.   Musculoskeletal: Normal range of motion. She exhibits no edema or deformity.   Neurological: She is alert and oriented to person, place, and time. She exhibits normal muscle tone. Coordination normal.   Skin: Skin is warm, dry and intact. She is not diaphoretic. No pallor.   Psychiatric: She has a normal mood and affect. Her speech is normal and behavior is normal. Judgment and thought content normal. Cognition and memory are normal.   Nursing note and vitals reviewed.      Assessment:       1. Upper respiratory tract infection, unspecified type        Plan:         Upper respiratory tract infection, unspecified type  -     predniSONE (DELTASONE) 20 MG tablet; 60 mg on Thursday (3 pills)  40 mg on Friday (2 pills)  Dispense: 5 tablet; Refill: 0      Patient Instructions     You must understand that you've received an Urgent Care treatment only and that you may be released before all your medical problems are known or treated.  You, the patient, will arrange for follow up care as instructed.  Follow up with your PCP or specialty clinic as directed in the next 1-2 weeks if not improved or as needed.  You can call [phone] to schedule an appointment with the appropriate provider.  If your condition worsens we recommend that you receive another evaluation at the emergency room immediately or contact your primary medical clinics after hours call service to discuss your concerns.  Please return here or go to the Emergency Department for any concerns or worsening of condition.    If you were prescribed a narcotic or controlled medication, do not drive or operate heavy equipment or machinery while taking these medications.  Take Mucinex D in the morning and Mucinex DM at night.   Mucinex D has 120 mg of Pseudoephedrine in it and can cause palpitations and high blood pressure. If you experience this stop the Mucinex D and only take Mucinex DM (twice a day).    Viral Upper Respiratory Illness (Adult)  You have a viral upper respiratory illness (URI), which is another term for the common cold. This illness is contagious during the first few days. It is spread through the air by coughing and sneezing. It may also be spread by direct contact (touching the sick person and then touching your own eyes, nose, or mouth). Frequent handwashing will decrease risk of spread. Most viral illnesses go away within 7 to 10 days with rest and simple home remedies. Sometimes the illness may last for several weeks. Antibiotics will not kill a virus, and they are generally not prescribed for this condition.    Home care  · If symptoms are severe, rest at home for the first 2 to 3 days. When you resume activity, don't let yourself get too tired.  · Avoid being exposed to cigarette smoke (yours or others).  · You may use acetaminophen or ibuprofen to control pain and fever, unless another medicine was prescribed. (Note: If you have chronic liver or kidney disease,  have ever had a stomach ulcer or gastrointestinal bleeding, or are taking blood-thinning medicines, talk with your healthcare provider before using these medicines.) Aspirin should never be given to anyone under 18 years of age who is ill with a viral infection or fever. It may cause severe liver or brain damage.  · Your appetite may be poor, so a light diet is fine. Avoid dehydration by drinking 6 to 8 glasses of fluids per day (water, soft drinks, juices, tea, or soup). Extra fluids will help loosen secretions in the nose and lungs.  · Over-the-counter cold medicines will not shorten the length of time youre sick, but they may be helpful for the following symptoms: cough, sore throat, and nasal and sinus congestion. (Note: Do not use decongestants if you have high blood pressure.)  Follow-up care  Follow up with your healthcare provider, or as advised.  When to seek medical advice  Call your healthcare provider right away if any of these occur:  · Cough with lots of colored sputum (mucus)  · Severe headache; face, neck, or ear pain  · Difficulty swallowing due to throat pain  · Fever of 100.4°F (38°C)  Call 911, or get immediate medical care  Call emergency services right away if any of these occur:  · Chest pain, shortness of breath, wheezing, or difficulty breathing  · Coughing up blood  · Inability to swallow due to throat pain  Date Last Reviewed: 9/13/2015  © 5053-2240 [employer]. [address] [place] [address], [place] [address]. All rights reserved. This information is not intended as a substitute for professional medical care. Always follow your healthcare professional's instructions.

## 2019-10-07 NOTE — PATIENT INSTRUCTIONS
You must understand that you've received an Urgent Care treatment only and that you may be released before all your medical problems are known or treated. You, the patient, will arrange for follow up care as instructed.  Follow up with your PCP or specialty clinic as directed in the next 1-2 weeks if not improved or as needed.  You can call [phone] to schedule an appointment with the appropriate provider.  If your condition worsens we recommend that you receive another evaluation at the emergency room immediately or contact your primary medical clinics after hours call service to discuss your concerns.  Please return here or go to the Emergency Department for any concerns or worsening of condition.    If you were prescribed a narcotic or controlled medication, do not drive or operate heavy equipment or machinery while taking these medications.  Take Mucinex D in the morning and Mucinex DM at night.   Mucinex D has 120 mg of Pseudoephedrine in it and can cause palpitations and high blood pressure. If you experience this stop the Mucinex D and only take Mucinex DM (twice a day).    Viral Upper Respiratory Illness (Adult)  You have a viral upper respiratory illness (URI), which is another term for the common cold. This illness is contagious during the first few days. It is spread through the air by coughing and sneezing. It may also be spread by direct contact (touching the sick person and then touching your own eyes, nose, or mouth). Frequent handwashing will decrease risk of spread. Most viral illnesses go away within 7 to 10 days with rest and simple home remedies. Sometimes the illness may last for several weeks. Antibiotics will not kill a virus, and they are generally not prescribed for this condition.    Home care  · If symptoms are severe, rest at home for the first 2 to 3 days. When you resume activity, don't let yourself get too tired.  · Avoid being exposed to cigarette smoke (yours or  others).  · You may use acetaminophen or ibuprofen to control pain and fever, unless another medicine was prescribed. (Note: If you have chronic liver or kidney disease, have ever had a stomach ulcer or gastrointestinal bleeding, or are taking blood-thinning medicines, talk with your healthcare provider before using these medicines.) Aspirin should never be given to anyone under 18 years of age who is ill with a viral infection or fever. It may cause severe liver or brain damage.  · Your appetite may be poor, so a light diet is fine. Avoid dehydration by drinking 6 to 8 glasses of fluids per day (water, soft drinks, juices, tea, or soup). Extra fluids will help loosen secretions in the nose and lungs.  · Over-the-counter cold medicines will not shorten the length of time youre sick, but they may be helpful for the following symptoms: cough, sore throat, and nasal and sinus congestion. (Note: Do not use decongestants if you have high blood pressure.)  Follow-up care  Follow up with your healthcare provider, or as advised.  When to seek medical advice  Call your healthcare provider right away if any of these occur:  · Cough with lots of colored sputum (mucus)  · Severe headache; face, neck, or ear pain  · Difficulty swallowing due to throat pain  · Fever of 100.4°F (38°C)  Call 911, or get immediate medical care  Call emergency services right away if any of these occur:  · Chest pain, shortness of breath, wheezing, or difficulty breathing  · Coughing up blood  · Inability to swallow due to throat pain  Date Last Reviewed: 9/13/2015 © 2000-2017 [employer]. [address] [place] [address], [place] [address]. All rights reserved. This information is not intended as a substitute for professional medical care. Always follow your healthcare professional's instructions.

## 2020-03-13 ENCOUNTER — OFFICE VISIT (OUTPATIENT)
Dept: OPTOMETRY | Facility: CLINIC | Age: 30
End: 2020-03-13
Payer: COMMERCIAL

## 2020-03-13 DIAGNOSIS — H11.151 PINGUECULA, RIGHT EYE: ICD-10-CM

## 2020-03-13 DIAGNOSIS — H53.9 VISUAL DISTURBANCE: Primary | ICD-10-CM

## 2020-03-13 PROCEDURE — 92004 PR EYE EXAM, NEW PATIENT,COMPREHESV: ICD-10-PCS | Mod: S$GLB,,, | Performed by: OPTOMETRIST

## 2020-03-13 PROCEDURE — 92004 COMPRE OPH EXAM NEW PT 1/>: CPT | Mod: S$GLB,,, | Performed by: OPTOMETRIST

## 2020-03-13 PROCEDURE — 99999 PR PBB SHADOW E&M-EST. PATIENT-LVL II: CPT | Mod: PBBFAC,,, | Performed by: OPTOMETRIST

## 2020-03-13 PROCEDURE — 99999 PR PBB SHADOW E&M-EST. PATIENT-LVL II: ICD-10-PCS | Mod: PBBFAC,,, | Performed by: OPTOMETRIST

## 2020-03-13 RX ORDER — PRENATAL VIT 27,CALC/IRON/FA 60 MG-1 MG
TABLET ORAL
COMMUNITY
Start: 2020-03-10 | End: 2022-07-27

## 2020-03-13 RX ORDER — NEOMYCIN SULFATE, POLYMYXIN B SULFATE AND DEXAMETHASONE 3.5; 10000; 1 MG/ML; [USP'U]/ML; MG/ML
1 SUSPENSION/ DROPS OPHTHALMIC 4 TIMES DAILY
Qty: 5 ML | Refills: 0 | Status: SHIPPED | OUTPATIENT
Start: 2020-03-13 | End: 2020-03-20

## 2020-03-13 NOTE — PROGRESS NOTES
HPI     SANJUANITA 12/19 elsewhere.  Patient has noticed redness OD in the last day or   2.  No itching, irritation or watering.  Patient states she has allergies   but they are not bad right now.  Patient has had flashes OD for the past 2   weeks, no floaters.  Patient is nearsighted and wears contacts for 16 hrs   and they are daily wear.    Last edited by Venu Bhatti, OD on 3/13/2020  1:35 PM. (History)            Assessment /Plan     For exam results, see Encounter Report.    Visual disturbance    Pinguecula, right eye  -     neomycin-polymyxin-dexamethasone (MAXITROL) 3.5mg/mL-10,000 unit/mL-0.1 % DrpS; Place 1 drop into the right eye 4 (four) times daily. for 7 days  Dispense: 5 mL; Refill: 0      1. Flashes , no floaters , no evidence of holes tears or detach, No evidence of holes, tears or detachment of retina. Negative Shafers sign in vitreous. 90 diopter lens exam negative in all quadrants. Patient educated to signs and symptoms of retinal detachment and return to clinic immediately if signs or symptoms arise.  2. Contact lens wearer, stop contacts, start maxitrol drops 4x/day x 1 week, ok to restart after 5-7 days if better.

## 2021-04-15 ENCOUNTER — PATIENT MESSAGE (OUTPATIENT)
Dept: RESEARCH | Facility: HOSPITAL | Age: 31
End: 2021-04-15

## 2022-06-16 LAB
HPV MRNA E6/E7: NORMAL
PAP RECOMMENDATION EXT: NORMAL

## 2022-07-27 ENCOUNTER — OFFICE VISIT (OUTPATIENT)
Dept: FAMILY MEDICINE | Facility: CLINIC | Age: 32
End: 2022-07-27
Payer: COMMERCIAL

## 2022-07-27 ENCOUNTER — LAB VISIT (OUTPATIENT)
Dept: LAB | Facility: HOSPITAL | Age: 32
End: 2022-07-27
Attending: STUDENT IN AN ORGANIZED HEALTH CARE EDUCATION/TRAINING PROGRAM
Payer: COMMERCIAL

## 2022-07-27 ENCOUNTER — PATIENT OUTREACH (OUTPATIENT)
Dept: ADMINISTRATIVE | Facility: HOSPITAL | Age: 32
End: 2022-07-27
Payer: COMMERCIAL

## 2022-07-27 VITALS
HEIGHT: 66 IN | HEART RATE: 88 BPM | OXYGEN SATURATION: 99 % | WEIGHT: 126.88 LBS | TEMPERATURE: 98 F | DIASTOLIC BLOOD PRESSURE: 80 MMHG | SYSTOLIC BLOOD PRESSURE: 124 MMHG | BODY MASS INDEX: 20.39 KG/M2

## 2022-07-27 DIAGNOSIS — R11.0 NAUSEA: ICD-10-CM

## 2022-07-27 DIAGNOSIS — E03.9 HYPOTHYROIDISM, UNSPECIFIED TYPE: ICD-10-CM

## 2022-07-27 DIAGNOSIS — R07.89 CHEST PRESSURE: Primary | ICD-10-CM

## 2022-07-27 DIAGNOSIS — R07.89 CHEST PRESSURE: ICD-10-CM

## 2022-07-27 LAB
ALBUMIN SERPL BCP-MCNC: 3.9 G/DL (ref 3.5–5.2)
ALP SERPL-CCNC: 42 U/L (ref 55–135)
ALT SERPL W/O P-5'-P-CCNC: 9 U/L (ref 10–44)
ANION GAP SERPL CALC-SCNC: 9 MMOL/L (ref 8–16)
AST SERPL-CCNC: 18 U/L (ref 10–40)
BASOPHILS # BLD AUTO: 0.04 K/UL (ref 0–0.2)
BASOPHILS NFR BLD: 0.6 % (ref 0–1.9)
BILIRUB SERPL-MCNC: 0.6 MG/DL (ref 0.1–1)
BUN SERPL-MCNC: 10 MG/DL (ref 6–20)
CALCIUM SERPL-MCNC: 9.6 MG/DL (ref 8.7–10.5)
CHLORIDE SERPL-SCNC: 106 MMOL/L (ref 95–110)
CO2 SERPL-SCNC: 23 MMOL/L (ref 23–29)
CREAT SERPL-MCNC: 0.8 MG/DL (ref 0.5–1.4)
DIFFERENTIAL METHOD: NORMAL
EOSINOPHIL # BLD AUTO: 0.1 K/UL (ref 0–0.5)
EOSINOPHIL NFR BLD: 1.9 % (ref 0–8)
ERYTHROCYTE [DISTWIDTH] IN BLOOD BY AUTOMATED COUNT: 12.2 % (ref 11.5–14.5)
EST. GFR  (AFRICAN AMERICAN): >60 ML/MIN/1.73 M^2
EST. GFR  (NON AFRICAN AMERICAN): >60 ML/MIN/1.73 M^2
GLUCOSE SERPL-MCNC: 99 MG/DL (ref 70–110)
HCG INTACT+B SERPL-ACNC: <2.4 MIU/ML
HCT VFR BLD AUTO: 37.6 % (ref 37–48.5)
HGB BLD-MCNC: 12.8 G/DL (ref 12–16)
IMM GRANULOCYTES # BLD AUTO: 0.01 K/UL (ref 0–0.04)
IMM GRANULOCYTES NFR BLD AUTO: 0.1 % (ref 0–0.5)
LYMPHOCYTES # BLD AUTO: 2.1 K/UL (ref 1–4.8)
LYMPHOCYTES NFR BLD: 29.5 % (ref 18–48)
MCH RBC QN AUTO: 30.5 PG (ref 27–31)
MCHC RBC AUTO-ENTMCNC: 34 G/DL (ref 32–36)
MCV RBC AUTO: 90 FL (ref 82–98)
MONOCYTES # BLD AUTO: 0.5 K/UL (ref 0.3–1)
MONOCYTES NFR BLD: 7.2 % (ref 4–15)
NEUTROPHILS # BLD AUTO: 4.2 K/UL (ref 1.8–7.7)
NEUTROPHILS NFR BLD: 60.7 % (ref 38–73)
NRBC BLD-RTO: 0 /100 WBC
PLATELET # BLD AUTO: 192 K/UL (ref 150–450)
PMV BLD AUTO: 12.4 FL (ref 9.2–12.9)
POTASSIUM SERPL-SCNC: 4.5 MMOL/L (ref 3.5–5.1)
PROT SERPL-MCNC: 7.2 G/DL (ref 6–8.4)
RBC # BLD AUTO: 4.2 M/UL (ref 4–5.4)
SODIUM SERPL-SCNC: 138 MMOL/L (ref 136–145)
TSH SERPL DL<=0.005 MIU/L-ACNC: 1.66 UIU/ML (ref 0.4–4)
WBC # BLD AUTO: 6.98 K/UL (ref 3.9–12.7)

## 2022-07-27 PROCEDURE — 3074F SYST BP LT 130 MM HG: CPT | Mod: CPTII,S$GLB,, | Performed by: STUDENT IN AN ORGANIZED HEALTH CARE EDUCATION/TRAINING PROGRAM

## 2022-07-27 PROCEDURE — 1159F PR MEDICATION LIST DOCUMENTED IN MEDICAL RECORD: ICD-10-PCS | Mod: CPTII,S$GLB,, | Performed by: STUDENT IN AN ORGANIZED HEALTH CARE EDUCATION/TRAINING PROGRAM

## 2022-07-27 PROCEDURE — 1159F MED LIST DOCD IN RCRD: CPT | Mod: CPTII,S$GLB,, | Performed by: STUDENT IN AN ORGANIZED HEALTH CARE EDUCATION/TRAINING PROGRAM

## 2022-07-27 PROCEDURE — 85025 COMPLETE CBC W/AUTO DIFF WBC: CPT | Performed by: STUDENT IN AN ORGANIZED HEALTH CARE EDUCATION/TRAINING PROGRAM

## 2022-07-27 PROCEDURE — 3008F PR BODY MASS INDEX (BMI) DOCUMENTED: ICD-10-PCS | Mod: CPTII,S$GLB,, | Performed by: STUDENT IN AN ORGANIZED HEALTH CARE EDUCATION/TRAINING PROGRAM

## 2022-07-27 PROCEDURE — 99203 PR OFFICE/OUTPT VISIT, NEW, LEVL III, 30-44 MIN: ICD-10-PCS | Mod: S$GLB,,, | Performed by: STUDENT IN AN ORGANIZED HEALTH CARE EDUCATION/TRAINING PROGRAM

## 2022-07-27 PROCEDURE — 93005 EKG 12-LEAD: ICD-10-PCS | Mod: S$GLB,,, | Performed by: STUDENT IN AN ORGANIZED HEALTH CARE EDUCATION/TRAINING PROGRAM

## 2022-07-27 PROCEDURE — 3079F DIAST BP 80-89 MM HG: CPT | Mod: CPTII,S$GLB,, | Performed by: STUDENT IN AN ORGANIZED HEALTH CARE EDUCATION/TRAINING PROGRAM

## 2022-07-27 PROCEDURE — 93010 EKG 12-LEAD: ICD-10-PCS | Mod: S$GLB,,, | Performed by: INTERNAL MEDICINE

## 2022-07-27 PROCEDURE — 3079F PR MOST RECENT DIASTOLIC BLOOD PRESSURE 80-89 MM HG: ICD-10-PCS | Mod: CPTII,S$GLB,, | Performed by: STUDENT IN AN ORGANIZED HEALTH CARE EDUCATION/TRAINING PROGRAM

## 2022-07-27 PROCEDURE — 93010 ELECTROCARDIOGRAM REPORT: CPT | Mod: S$GLB,,, | Performed by: INTERNAL MEDICINE

## 2022-07-27 PROCEDURE — 84702 CHORIONIC GONADOTROPIN TEST: CPT | Performed by: STUDENT IN AN ORGANIZED HEALTH CARE EDUCATION/TRAINING PROGRAM

## 2022-07-27 PROCEDURE — 3074F PR MOST RECENT SYSTOLIC BLOOD PRESSURE < 130 MM HG: ICD-10-PCS | Mod: CPTII,S$GLB,, | Performed by: STUDENT IN AN ORGANIZED HEALTH CARE EDUCATION/TRAINING PROGRAM

## 2022-07-27 PROCEDURE — 80053 COMPREHEN METABOLIC PANEL: CPT | Performed by: STUDENT IN AN ORGANIZED HEALTH CARE EDUCATION/TRAINING PROGRAM

## 2022-07-27 PROCEDURE — 36415 COLL VENOUS BLD VENIPUNCTURE: CPT | Mod: PO | Performed by: STUDENT IN AN ORGANIZED HEALTH CARE EDUCATION/TRAINING PROGRAM

## 2022-07-27 PROCEDURE — 93005 ELECTROCARDIOGRAM TRACING: CPT | Mod: S$GLB,,, | Performed by: STUDENT IN AN ORGANIZED HEALTH CARE EDUCATION/TRAINING PROGRAM

## 2022-07-27 PROCEDURE — 3008F BODY MASS INDEX DOCD: CPT | Mod: CPTII,S$GLB,, | Performed by: STUDENT IN AN ORGANIZED HEALTH CARE EDUCATION/TRAINING PROGRAM

## 2022-07-27 PROCEDURE — 99203 OFFICE O/P NEW LOW 30 MIN: CPT | Mod: S$GLB,,, | Performed by: STUDENT IN AN ORGANIZED HEALTH CARE EDUCATION/TRAINING PROGRAM

## 2022-07-27 PROCEDURE — 99999 PR PBB SHADOW E&M-EST. PATIENT-LVL III: ICD-10-PCS | Mod: PBBFAC,,, | Performed by: STUDENT IN AN ORGANIZED HEALTH CARE EDUCATION/TRAINING PROGRAM

## 2022-07-27 PROCEDURE — 99999 PR PBB SHADOW E&M-EST. PATIENT-LVL III: CPT | Mod: PBBFAC,,, | Performed by: STUDENT IN AN ORGANIZED HEALTH CARE EDUCATION/TRAINING PROGRAM

## 2022-07-27 PROCEDURE — 84443 ASSAY THYROID STIM HORMONE: CPT | Performed by: STUDENT IN AN ORGANIZED HEALTH CARE EDUCATION/TRAINING PROGRAM

## 2022-07-27 NOTE — PROGRESS NOTES
07/27/2022    Karis Mckenzie  [id number]    Chief Complaint   Patient presents with    Annual Exam              HPI    For the last week with increase fatigue, feeling off, some chest tightness, lightheadedness. Hx of taking levothyroid but it was discontinued during pregnancy b/c levels were normal. Describes chest tightness as a weight being there. Describes as constant and not radiating. Some SOB today while walking down stairs. Denies Fhx of blood clots or bleeding disorders. No hx of Asthma or inhaler use.  Recent long travel was 3 weeks ago. Last LMP 3 weeks ago and restarted  OCP 4 months ago.     Negative 10 point ROS    Past Medical History:   Diagnosis Date    Hypothyroidism     Ovarian cyst        Past Surgical History:   Procedure Laterality Date    OOPHORECTOMY      dermoid       Family History   Problem Relation Age of Onset    Diabetes Sister     Breast cancer Neg Hx     Colon cancer Neg Hx     Ovarian cancer Neg Hx     Blindness Neg Hx     Amblyopia Neg Hx     Cancer Neg Hx     Cataracts Neg Hx     Glaucoma Neg Hx     Hypertension Neg Hx     Macular degeneration Neg Hx     Retinal detachment Neg Hx     Strabismus Neg Hx     Stroke Neg Hx     Thyroid disease Neg Hx        Social History     Socioeconomic History    Marital status:    Tobacco Use    Smoking status: Never Smoker    Smokeless tobacco: Never Used   Substance and Sexual Activity    Alcohol use: Yes     Comment: occassionally    Drug use: No    Sexual activity: Yes     Partners: Male         Current Outpatient Medications:     norethindrone-ethinyl estradiol (NECON) 0.5-35 mg-mcg per tablet, Take 1 tablet by mouth once daily., Disp: , Rfl:         Vitals:    07/27/22 0937   BP: 124/80   Pulse: 88   Temp: 98.1 °F (36.7 °C)       PHYSICAL EXAM    GEN: NAD, AAox3, well nourished  HEENT: NCAT, EOMI, PEERL, no scleral injection, TM normal, moist mucous membranes, oropharynx clear, no erythema, no exudates  NECK:  full rom, no cervical lymphadenopathy, no thyroidmegally  CV: RRR, no m/r/g, trace LE edema  LUNGS: CTAB, non-labored breathing, no wheezes, no crackles  ABD: soft, non-distended, no rebound/guarding, no organomegaly  EXT: n c/c/e, warm, 5/5 UE and LE strength  NEURO: CNII-XII intact no focal deficit  PSYCH: nl affect, no hallucinations, nl speech  Skin: intact, no rashes/lesions/erythema    1. Chest pressure  - Comprehensive Metabolic Panel; Future  - CBC Auto Differential; Future  - IN OFFICE EKG 12-LEAD (to Muse): NSR no acute changes     2. Hypothyroidism, unspecified type  - TSH; Future    3. Nausea  - HCG, QUANTITATIVE, PREGNANCY; Future    RTC pending labs    Elisabeth Cerda MD  Family Medicine

## 2022-07-28 ENCOUNTER — TELEPHONE (OUTPATIENT)
Dept: FAMILY MEDICINE | Facility: CLINIC | Age: 32
End: 2022-07-28
Payer: COMMERCIAL

## 2022-07-28 NOTE — TELEPHONE ENCOUNTER
----- Message from Elisabeth Cerda MD sent at 7/28/2022  8:32 AM CDT -----  Please let pt know that her labs have all returned as normal.

## 2024-01-05 ENCOUNTER — TELEPHONE (OUTPATIENT)
Dept: ORTHOPEDICS | Facility: CLINIC | Age: 34
End: 2024-01-05
Payer: COMMERCIAL

## 2024-01-05 DIAGNOSIS — M79.641 RIGHT HAND PAIN: Primary | ICD-10-CM

## 2024-01-05 DIAGNOSIS — M79.642 LEFT HAND PAIN: Primary | ICD-10-CM

## 2024-01-05 NOTE — TELEPHONE ENCOUNTER
LVM c pt. Attempting to confirm appt location & time c Dr. Bustillos  with XR. Requested a call back to the [facility] at [phone] with any questions, concerns or need for a different appointment time.

## 2024-01-11 ENCOUNTER — HOSPITAL ENCOUNTER (OUTPATIENT)
Dept: RADIOLOGY | Facility: OTHER | Age: 34
Discharge: HOME | End: 2024-01-11
Attending: ORTHOPAEDIC SURGERY
Payer: COMMERCIAL

## 2024-01-11 ENCOUNTER — OFFICE VISIT (OUTPATIENT)
Dept: ORTHOPEDICS | Facility: CLINIC | Age: 34
End: 2024-01-11
Payer: COMMERCIAL

## 2024-01-11 VITALS
HEART RATE: 78 BPM | BODY MASS INDEX: 20.25 KG/M2 | DIASTOLIC BLOOD PRESSURE: 89 MMHG | HEIGHT: 66 IN | WEIGHT: 126 LBS | SYSTOLIC BLOOD PRESSURE: 148 MMHG

## 2024-01-11 DIAGNOSIS — M79.642 LEFT HAND PAIN: Primary | ICD-10-CM

## 2024-01-11 DIAGNOSIS — M79.642 LEFT HAND PAIN: ICD-10-CM

## 2024-01-11 PROCEDURE — 73110 X-RAY EXAM OF WRIST: CPT | Mod: 26,LT,, | Performed by: RADIOLOGY

## 2024-01-11 PROCEDURE — 73110 X-RAY EXAM OF WRIST: CPT | Mod: TC,FY,LT

## 2024-01-11 PROCEDURE — 99204 OFFICE O/P NEW MOD 45 MIN: CPT | Mod: S$GLB,,, | Performed by: ORTHOPAEDIC SURGERY

## 2024-01-11 PROCEDURE — 1159F MED LIST DOCD IN RCRD: CPT | Mod: CPTII,S$GLB,, | Performed by: ORTHOPAEDIC SURGERY

## 2024-01-11 PROCEDURE — 99999 PR PBB SHADOW E&M-EST. PATIENT-LVL III: CPT | Mod: PBBFAC,,, | Performed by: ORTHOPAEDIC SURGERY

## 2024-01-11 PROCEDURE — 3077F SYST BP >= 140 MM HG: CPT | Mod: CPTII,S$GLB,, | Performed by: ORTHOPAEDIC SURGERY

## 2024-01-11 PROCEDURE — 3079F DIAST BP 80-89 MM HG: CPT | Mod: CPTII,S$GLB,, | Performed by: ORTHOPAEDIC SURGERY

## 2024-01-11 PROCEDURE — 3008F BODY MASS INDEX DOCD: CPT | Mod: CPTII,S$GLB,, | Performed by: ORTHOPAEDIC SURGERY

## 2024-01-11 NOTE — PROGRESS NOTES
"Subjective:      Patient ID: Karis Mckenzie is a 33 y.o. female.    Chief Complaint: Pain of the Left Hand      HPI  Karis Mckenzie is a right hand dominant 33 y.o. female presenting today for Left hand pain, numbness ring and small fingers.  There was not a history of trauma.  Onset of symptoms began about a month ago.  She describes it as constant numbness and tingling 6/10 pain, currently a 2 at rest, is localized to the ring and small fingers.  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  Pain does occasionally radiate into the forearm and is worse at night waking her at night. Previous treatments include OTC NSAIDS which have provided no relief.       The patient denies any fevers, chills, N/V, D/C and presents for evaluation.      Review of patient's allergies indicates:  No Known Allergies      Current Outpatient Medications   Medication Sig Dispense Refill    norethindrone-ethinyl estradiol (NECON) 0.5-35 mg-mcg per tablet Take 1 tablet by mouth once daily.       No current facility-administered medications for this visit.       Past Medical History:   Diagnosis Date    Hypothyroidism     Ovarian cyst        Past Surgical History:   Procedure Laterality Date    OOPHORECTOMY      dermoid       Review of Systems:  Constitutional: Negative for chills and fever.   Respiratory: Negative for cough and shortness of breath.    Gastrointestinal: Negative for nausea and vomiting.   Skin: Negative for rash.   Neurological: Negative for dizziness and headaches.   Psychiatric/Behavioral: Negative for depression.   MSK as in HPI       OBJECTIVE:     PHYSICAL EXAM:  BP (!) 148/89   Pulse 78   Ht 5' 6" (1.676 m)   Wt 57.2 kg (126 lb)   BMI 20.34 kg/m²     GEN:  NAD, well-developed, well-groomed.  NEURO: Awake, alert, and oriented. Normal attention and concentration.    PSYCH: Normal mood and affect. Behavior is normal.  HEENT: No cervical lymphadenopathy noted.  CARDIOVASCULAR: Radial " pulses 2+ bilaterally. No LE edema noted.  PULMONARY: Breath sounds normal. No respiratory distress.  SKIN: Intact, no rashes.      MSK:     RUE:  Good active ROM of the wrist and fingers. AIN/PIN/Radial/Median/Ulnar Nerves assessed in isolation without deficit. Radial & Ulnar arteries palpated 2+. Capillary Refill <3s. Negative Tinel's at wrist and elbow.    LUE:  Good active ROM of the wrist and fingers. AIN/PIN/Radial/Median/Ulnar Nerves assessed in isolation without deficit. Radial & Ulnar arteries palpated 2+. Capillary Refill <3s. Negative Tinel's at wrist and elbow.        RADIOGRAPHS:  Left Hand Xray 01/11/24  FINDINGS:  Wrist three views left.     No fracture dislocation bone destruction seen.     Comments: I have personally reviewed the imaging and I agree with the above radiologist's report.    ASSESSMENT/PLAN:   No diagnosis found.       No orders of the defined types were placed in this encounter.       Plan:   EMG and follow up with Dr. Bustillos        The patient indicates understanding of these issues and agrees to the plan.        This note has been scribed in part by Delroy Pike, ATC/L, OTC, my Sports Medicine Assistant (SMA). This SMA performed & documented a complete history pre-assessment including the history of present illness, which I, Delicia Mcdonald MD, explored & confirmed personally with the patient. The SMA has scribed portions of this note including my physical exanimation, diagnostic imaging interpretation, procedures performed, my plan of care & diagnosis. I agree that the scribed documentation is accurate & complete.

## 2024-03-05 ENCOUNTER — PROCEDURE VISIT (OUTPATIENT)
Dept: NEUROLOGY | Facility: CLINIC | Age: 34
End: 2024-03-05
Payer: COMMERCIAL

## 2024-03-05 DIAGNOSIS — M79.642 LEFT HAND PAIN: ICD-10-CM

## 2024-03-05 PROCEDURE — 95886 MUSC TEST DONE W/N TEST COMP: CPT | Mod: S$GLB,,, | Performed by: PHYSICAL MEDICINE & REHABILITATION

## 2024-03-05 PROCEDURE — 95911 NRV CNDJ TEST 9-10 STUDIES: CPT | Mod: S$GLB,,, | Performed by: PHYSICAL MEDICINE & REHABILITATION

## 2024-03-05 NOTE — PROCEDURES
Test Date:  3/5/2024    Patient: karis mckenzie : 1990 Physician: Reese Stein D.O.   ID#: [id number] SEX: Female Ref. Phys: Delicia Mcdonald, *     HPI: Karis Mckenzie is a 33 y.o.female who presents for NCS/EMG to evaluate for left median and ulnar neuropathies.    NCV & EMG Findings:  All nerve conduction studies (as indicated in the following tables) were within normal limits.  All examined muscles (as indicated in the following table) showed no evidence of electrical instability.    Impression:  This is a normal electrophysiologic study of the left upper extremity.        ___________________________  Reese Stein D.O.        NCS+  Motor Nerve Results      Latency Amplitude F-Lat Segment Distance CV Comment   Site (ms) Norm (mV) Norm (ms)  (cm) (m/s) Norm    Left Median (APB)   Wrist 2.9  < 4.4 6.5  > 5.9         Elbow 6.5 - 6.3 -  Elbow-Wrist 23 64  > 53    Right Median (APB)   Wrist 3.4  < 4.4 8.1  > 5.9         Elbow 7.2 - 8.0 -  Elbow-Wrist 23 61  > 53    Left Ulnar (ADM)   Wrist 2.4  < 3.7 11.2  > 3.0         Bel Elbow 6.1 - 7.4 -  Bel Elbow-Wrist 25 68  > 52    Abv Elbow 7.6 - 9.4 -  Abv Elbow-Bel Elbow 10 67  > 43    Right Ulnar (ADM)   Wrist 2.3  < 3.7 9.2  > 3.0         Bel Elbow 6.0 - 7.3 -  Bel Elbow-Wrist 24 65  > 52      Sensory Nerve Results      Latency (Peak) Amplitude (P-P) Segment Distance CV Comment   Site (ms) Norm (µV) Norm  (cm) (m/s) Norm    Left Median   Wrist-Dig I 2.5 - 99 - Wrist-Dig I 10 40 -    Wrist-Dig II 3.2  < 4.0 89  > 19 Wrist-Dig II 14 44  > 39    Palm-Wrist 1.50 - 138 - Palm-Wrist - - -    Right Median (Rec:Dig II)   Wrist 3.0  < 4.0 73  > 19 Wrist-Dig II 16 53  > 39    Left Ulnar   Wrist-Dig V 2.8  < 4.0 30  > 13 Wrist-Dig V 14 50  > 38    Palm-Wrist 1.73 - 21 - Palm-Wrist - - -    Right Ulnar (Rec:Dig V)   Wrist 2.5  < 4.0 93  > 13 Wrist-Dig V 14 56  > 38    Left Radial (Rec:Dig I)   Wrist 2.7 - 16 - Wrist-Dig I 10 37 -      Inter-Nerve  Comparisons     Nerve 1 Value 1 Nerve 2 Value 2 Parameter Result Normal   Sensory Sites   L Median Wrist-Dig I 2.5 ms L Radial Wrist-Dig I 2.7 ms Peak Lat Diff 0.20 ms <0.40   L Median Palm-Wrist 1.50 ms L Ulnar Palm-Wrist 1.73 ms Peak Lat Diff 0.23 ms <0.30     EMG+     Side Muscle Nerve Root Ins Act Fibs Psw Amp Dur Poly Recrt Int Pat Comment   Left Deltoid Axillary C5-C6 Nml Nml Nml Nml Nml 0 Nml Nml    Left Biceps Musculocut C5-C6 Nml Nml Nml Nml Nml 0 Nml Nml    Left Triceps Radial C6-C8 Nml Nml Nml Nml Nml 0 Nml Nml    Left Pronator Teres Median C6-C7 Nml Nml Nml Nml Nml 0 Nml Nml    Left FDI Ulnar C8-T1 Nml Nml Nml Nml Nml 0 Nml Nml            Waveforms:    Motor              Sensory

## 2024-03-08 ENCOUNTER — PATIENT MESSAGE (OUTPATIENT)
Dept: ORTHOPEDICS | Facility: CLINIC | Age: 34
End: 2024-03-08
Payer: COMMERCIAL